# Patient Record
Sex: FEMALE | Race: WHITE | NOT HISPANIC OR LATINO | Employment: FULL TIME | URBAN - NONMETROPOLITAN AREA
[De-identification: names, ages, dates, MRNs, and addresses within clinical notes are randomized per-mention and may not be internally consistent; named-entity substitution may affect disease eponyms.]

---

## 2024-07-22 ENCOUNTER — OFFICE VISIT (OUTPATIENT)
Dept: URGENT CARE | Facility: CLINIC | Age: 24
End: 2024-07-22
Payer: COMMERCIAL

## 2024-07-22 VITALS
DIASTOLIC BLOOD PRESSURE: 76 MMHG | TEMPERATURE: 98.6 F | OXYGEN SATURATION: 99 % | RESPIRATION RATE: 20 BRPM | HEART RATE: 85 BPM | SYSTOLIC BLOOD PRESSURE: 116 MMHG

## 2024-07-22 DIAGNOSIS — L08.9 INFECTED SEBACEOUS CYST: Primary | ICD-10-CM

## 2024-07-22 DIAGNOSIS — L72.3 INFECTED SEBACEOUS CYST: Primary | ICD-10-CM

## 2024-07-22 PROCEDURE — S9083 URGENT CARE CENTER GLOBAL: HCPCS | Performed by: PHYSICIAN ASSISTANT

## 2024-07-22 PROCEDURE — 87070 CULTURE OTHR SPECIMN AEROBIC: CPT | Performed by: PHYSICIAN ASSISTANT

## 2024-07-22 PROCEDURE — G0382 LEV 3 HOSP TYPE B ED VISIT: HCPCS | Performed by: PHYSICIAN ASSISTANT

## 2024-07-22 PROCEDURE — 87205 SMEAR GRAM STAIN: CPT | Performed by: PHYSICIAN ASSISTANT

## 2024-07-22 PROCEDURE — 87147 CULTURE TYPE IMMUNOLOGIC: CPT | Performed by: PHYSICIAN ASSISTANT

## 2024-07-22 PROCEDURE — 87186 SC STD MICRODIL/AGAR DIL: CPT | Performed by: PHYSICIAN ASSISTANT

## 2024-07-22 RX ORDER — SULFAMETHOXAZOLE AND TRIMETHOPRIM 800; 160 MG/1; MG/1
1 TABLET ORAL EVERY 12 HOURS SCHEDULED
Qty: 14 TABLET | Refills: 0 | Status: SHIPPED | OUTPATIENT
Start: 2024-07-22 | End: 2024-07-29

## 2024-07-22 NOTE — PROGRESS NOTES
[facility]        NAME: Gray Tamayo is a 24 y.o. female  : 2000    MRN: [id number]  DATE: 2024  TIME: 6:37 PM    Assessment and Plan   Infected sebaceous cyst [L72.3, L08.9]  1. Infected sebaceous cyst  sulfamethoxazole-trimethoprim (BACTRIM DS) 800-160 mg per tablet    Wound culture and Gram stain    Wound culture and Gram stain            Patient Instructions   There are no Patient Instructions on file for this visit.      Follow up with PCP in 3-5 days.  Proceed to  ER if symptoms worsen.    Chief Complaint     Chief Complaint   Patient presents with    Cyst     Started Friday as a pimple on right upper thigh.  Has become much larger, red, purple and painful.  Did expel some drainage for green/yellow and bloody drainage. Applying warm compresses         History of Present Illness       Patient presents the clinic for a painful cyst on the right upper thigh for the past 3 days.  She states that it has been draining some purulent drainage.  She denies associated fevers or chills.        Review of Systems   Review of Systems   Constitutional:  Negative for chills and fever.   Skin:  Positive for wound. Negative for color change and pallor.         Current Medications       Current Outpatient Medications:     sulfamethoxazole-trimethoprim (BACTRIM DS) 800-160 mg per tablet, Take 1 tablet by mouth every 12 (twelve) hours for 7 days, Disp: 14 tablet, Rfl: 0    Levonorgestrel (MIRENA) 20 MCG/DAY IUD, 1 each by Intrauterine route, Disp: , Rfl:     Current Allergies     Allergies as of 2024 - Reviewed 2024   Allergen Reaction Noted    Egg solids, whole Anaphylaxis 2017            The following portions of the patient's history were reviewed and updated as appropriate: allergies, current medications, past family history, past medical history, past social history, past surgical history and problem list.     Past Medical History:   Diagnosis Date    Migraines        Past  "Surgical History:   Procedure Laterality Date    DENTAL SURGERY         No family history on file.      Medications have been verified.        Objective   /76 (BP Location: Left arm)   Pulse 85   Temp 98.6 °F (37 °C) (Skin)   Resp 20   SpO2 99%        Physical Exam     Physical Exam  Exam conducted with a chaperone present.   Skin:            Comments: -There is a sebaceous cyst noted on the right upper thigh that is approximately 3 cm x 2 cm in size.  The area is indurated.  Area is tender to the touch.   Neurological:      Mental Status: She is alert.   Psychiatric:         Mood and Affect: Mood normal.           Incision and drain    Date/Time: 7/22/2024 4:30 PM    Performed by: Gabriele Koenig PA-C  Authorized by: Gabriele Koenig PA-C  Universal Protocol:  Consent: Verbal consent obtained.  Risks and benefits: risks, benefits and alternatives were discussed  Consent given by: patient  Patient identity confirmed: verbally with patient    Patient location:  Clinic  Location:     Type:  Cyst    Location:  Lower extremity    Lower extremity location:  R leg  Pre-procedure details:     Skin preparation:  Antiseptic wash and Betadine    Skin preparation:  Normal fashion  Anesthesia (see MAR for exact dosages):     Anesthesia method:  Topical application and local infiltration    Local anesthetic:  Lidocaine 2% w/o epi  Procedure details:     Complexity:  Simple    Incision types:  Elliptical    Scalpel blade:  11    Approach:  Open    Incision depth:  Subcutaneous    Wound management:  Probed and deloculated    Drainage:  Purulent and bloody    Drainage amount:  Moderate    Packing materials:  1/4 in gauze    Amount 1/4\":  1 inch  Post-procedure details:     Patient tolerance of procedure:  Tolerated well, no immediate complications          -Wound culture was sent to the lab.  I will treat with Bactrim until the culture is resulted.  She was instructed to follow-up with PCP or go to ER if symptoms worsen  "

## 2024-07-25 ENCOUNTER — OFFICE VISIT (OUTPATIENT)
Dept: URGENT CARE | Facility: CLINIC | Age: 24
End: 2024-07-25

## 2024-07-25 VITALS
OXYGEN SATURATION: 98 % | TEMPERATURE: 98 F | HEART RATE: 80 BPM | RESPIRATION RATE: 18 BRPM | DIASTOLIC BLOOD PRESSURE: 86 MMHG | SYSTOLIC BLOOD PRESSURE: 98 MMHG

## 2024-07-25 DIAGNOSIS — Z51.89 WOUND CHECK, ABSCESS: Primary | ICD-10-CM

## 2024-07-25 LAB
BACTERIA WND AEROBE CULT: ABNORMAL
GRAM STN SPEC: ABNORMAL
GRAM STN SPEC: ABNORMAL

## 2024-07-25 NOTE — PATIENT INSTRUCTIONS
Your wound culture tested positive for MRSA.  I recommend to continue with Bactrim as prescribed initially on 7/22.  Frequent bandage changing twice daily.  May clean with normal saline and antiseptic wash.  Warm compresses may be helpful.  Tylenol/ibuprofen for pain or fever.  Recommend calling family doctor to establish follow-up visit.  Follow up with PCP in 3-5 days.  Proceed to  ER if symptoms worsen.    If tests are performed, our office will contact you with results only if changes need to made to the care plan discussed with you at the visit. You can [facility]  Patient Education     Wound Care ED   General Information   You came to the Emergency Department (ED) for a wound. You may have a cut or scrape, or something may have punctured your skin. Anytime there is a break in your skin, it is a wound. Most of the time, you can care for your wound at home. How long it will take to heal is based on how serious the wound is.  What care is needed at home?   Call your regular doctor to let them know you were in the ED. Make a follow-up appointment if you were told to.  The doctor may want you to keep your wound covered as it heals. You may want to use a thin layer of antibiotic ointment to help keep the wound moist. This will also keep the dressing from sticking to the wound.  After 24 hours, you can gently wash the wound with soap and water. Pat dry and put on a clean dressing.  Change your dressing once a day or every other day.  Always wash your hands before and after touching the wound.  Each time you change the dressing, look closely at the wound to be sure it is healing the right way. The wound may have a yellowish discharge and this is normal.  Avoid picking the scab or scratching the site which may cause more irritation.  Do not soak in water or swim with an open wound.  When do I need to get emergency help?   Return to the ED if:   The pain in and around the area gets  much worse.  There is a bad smell or pus (thick yellow, green, or gray fluid) coming from your wound.  You develop a fever of 102.2 F (39 C) or higher or chills.  You notice a crunchy feeling or blisters in the skin around the wound.  The redness around your wound gets bigger or is spreading up your arm or leg.  When do I need to call the doctor?   Fluid that is not pus drains from your wound.  Your swelling doesn’t improve or gets worse.  You develop a fever of 100°F (37.8°C) or higher.  You have new or worsening symptoms.  Last Reviewed Date   2021-03-18  Consumer Information Use and Disclaimer   This generalized information is a limited summary of diagnosis, treatment, and/or medication information. It is not meant to be comprehensive and should be used as a tool to help the user understand and/or assess potential diagnostic and treatment options. It does NOT include all information about conditions, treatments, medications, side effects, or risks that may apply to a specific patient. It is not intended to be medical advice or a substitute for the medical advice, diagnosis, or treatment of a health care provider based on the health care provider's examination and assessment of a patient’s specific and unique circumstances. Patients must speak with a health care provider for complete information about their health, medical questions, and treatment options, including any risks or benefits regarding use of medications. This information does not endorse any treatments or medications as safe, effective, or approved for treating a specific patient. [employer] and its affiliates disclaim any warranty or liability relating to this information or the use thereof. The use of this information is governed by the Terms of Use, available at https://www.[employer].com/en/know/clinical-effectiveness-terms   Copyright   Copyright © 2024 [employer] and its affiliates and/or licensors. All rights reserved.

## 2024-07-25 NOTE — PROGRESS NOTES
[facility]        NAME: Gray Tamayo is a 24 y.o. female  : 2000    MRN: [id number]  DATE: 2024  TIME: 7:46 PM    Assessment and Plan   Wound check, abscess [Z51.89]  1. Wound check, abscess          Patient was here on  for infected sebaceous cyst in the right inner groin region.  Incision and drainage was performed with packing material placed.  Wound culture did prove positive for MRSA.  She was started on Bactrim, which I will have her continue with.  Patient did change the bandage at night after having an I&D, packing is not present on exam and may have been removed when taking the bandage off.  There is no surrounding erythema.  Very minimal tenderness.  No obvious drainage.  Wound was cleaned with saline and chlorhexidine scrub gently.  New bandage applied with bacitracin.  Advised follow-up with family doctor.  Advised with ER if any symptoms worsen.    Patient Instructions     Your wound culture tested positive for MRSA.  I recommend to continue with Bactrim as prescribed initially on .  Frequent bandage changing twice daily.  May clean with normal saline and antiseptic wash.  Warm compresses may be helpful.  Tylenol/ibuprofen for pain or fever.  Recommend calling family doctor to establish follow-up visit.  Follow up with PCP in 3-5 days.  Proceed to  ER if symptoms worsen.    If tests are performed, our office will contact you with results only if changes need to made to the care plan discussed with you at the visit. You can review your full results on [facility].    Chief Complaint     Chief Complaint   Patient presents with    Wound Check     Here for a wound check of vaginal was here 24 to have tx for same          History of Present Illness       24-year-old female presents the clinic for wound check and removal of packing after having an infected sebaceous cyst drained on .  Wound culture and Gram stain performed which did come back positive for  MRSA.  She was started on Bactrim which is susceptible.  Patient changed the bandage that night after having it drained because it was falling off.  Denies fever, chills, chest pain, shortness of breath.        Review of Systems   Review of Systems   Constitutional: Negative.    Respiratory: Negative.     Cardiovascular: Negative.    Skin:  Positive for wound.   Neurological: Negative.          Current Medications       Current Outpatient Medications:     Levonorgestrel (MIRENA) 20 MCG/DAY IUD, 1 each by Intrauterine route, Disp: , Rfl:     sulfamethoxazole-trimethoprim (BACTRIM DS) 800-160 mg per tablet, Take 1 tablet by mouth every 12 (twelve) hours for 7 days, Disp: 14 tablet, Rfl: 0    Current Allergies     Allergies as of 07/25/2024 - Reviewed 07/25/2024   Allergen Reaction Noted    Egg solids, whole Anaphylaxis 03/07/2017            The following portions of the patient's history were reviewed and updated as appropriate: allergies, current medications, past family history, past medical history, past social history, past surgical history and problem list.     Past Medical History:   Diagnosis Date    Migraines        Past Surgical History:   Procedure Laterality Date    DENTAL SURGERY         No family history on file.      Medications have been verified.        Objective   BP 98/86   Pulse 80   Temp 98 °F (36.7 °C)   Resp 18   SpO2 98%        Physical Exam     Physical Exam  Constitutional:       General: She is not in acute distress.     Appearance: Normal appearance. She is not ill-appearing or diaphoretic.   HENT:      Head: Normocephalic and atraumatic.   Cardiovascular:      Rate and Rhythm: Normal rate and regular rhythm.      Pulses: Normal pulses.      Heart sounds: Normal heart sounds.   Pulmonary:      Effort: Pulmonary effort is normal.      Breath sounds: Normal breath sounds.   Skin:     General: Skin is warm and dry.      Capillary Refill: Capillary refill takes less than 2 seconds.       Findings: Erythema (3 x 2 cm infected sebaceous cyst of the right inner groin region.  There is a small open wound from I&D on 7/22.  No obvious drainage.  Minimal tenderness and minimal erythema.  There is no packing in place.) present. No rash.          Neurological:      Mental Status: She is alert and oriented to person, place, and time.   Psychiatric:         Mood and Affect: Mood normal.